# Patient Record
Sex: FEMALE | Race: WHITE | NOT HISPANIC OR LATINO | URBAN - METROPOLITAN AREA
[De-identification: names, ages, dates, MRNs, and addresses within clinical notes are randomized per-mention and may not be internally consistent; named-entity substitution may affect disease eponyms.]

---

## 2019-04-12 ASSESSMENT — MIFFLIN-ST. JEOR: SCORE: 1252.21

## 2019-04-15 ENCOUNTER — ANESTHESIA - HEALTHEAST (OUTPATIENT)
Dept: SURGERY | Facility: HOSPITAL | Age: 54
End: 2019-04-15

## 2019-04-16 ENCOUNTER — SURGERY - HEALTHEAST (OUTPATIENT)
Dept: SURGERY | Facility: HOSPITAL | Age: 54
End: 2019-04-16

## 2020-06-03 ENCOUNTER — RECORDS - HEALTHEAST (OUTPATIENT)
Dept: ADMINISTRATIVE | Facility: OTHER | Age: 55
End: 2020-06-03

## 2021-05-25 ENCOUNTER — RECORDS - HEALTHEAST (OUTPATIENT)
Dept: ADMINISTRATIVE | Facility: CLINIC | Age: 56
End: 2021-05-25

## 2021-05-26 ENCOUNTER — RECORDS - HEALTHEAST (OUTPATIENT)
Dept: ADMINISTRATIVE | Facility: CLINIC | Age: 56
End: 2021-05-26

## 2021-05-27 ENCOUNTER — RECORDS - HEALTHEAST (OUTPATIENT)
Dept: ADMINISTRATIVE | Facility: CLINIC | Age: 56
End: 2021-05-27

## 2021-05-27 NOTE — ANESTHESIA CARE TRANSFER NOTE
Last vitals:   Vitals:    04/16/19 0840   BP: 112/64   Pulse: 71   Resp: 16   Temp: 36.4  C (97.5  F)   SpO2: 98%     Patient's level of consciousness is drowsy  Spontaneous respirations: yes  Maintains airway independently: yes  Dentition unchanged: yes  Oropharynx: oropharynx clear of all foreign objects    QCDR Measures:  ASA# 20 - Surgical Safety Checklist: WHO surgical safety checklist completed prior to induction    PQRS# 430 - Adult PONV Prevention: 4558F - Pt received => 2 anti-emetic agents (different classes) preop & intraop  ASA# 8 - Peds PONV Prevention: NA - Not pediatric patient, not GA or 2 or more risk factors NOT present  PQRS# 424 - Keri-op Temp Management: 4559F - At least one body temp DOCUMENTED => 35.5C or 95.9F within required timeframe  PQRS# 426 - PACU Transfer Protocol: - Transfer of care checklist used  ASA# 14 - Acute Post-op Pain: ASA14B - Patient did NOT experience pain >= 7 out of 10

## 2021-05-27 NOTE — ANESTHESIA PREPROCEDURE EVALUATION
Anesthesia Evaluation      Patient summary reviewed     Airway   Mallampati: II   Pulmonary - negative ROS and normal exam                          Cardiovascular - negative ROS and normal exam  ECG reviewed        Neuro/Psych - negative ROS     Endo/Other       Comments: H/O motion sickness    GI/Hepatic/Renal - negative ROS           Dental - normal exam                        Anesthesia Plan  Planned anesthetic: general endotracheal    ASA 1   Induction: intravenous   Anesthetic plan and risks discussed with: patient and spouse    Post-op plan: routine recovery

## 2021-05-27 NOTE — ANESTHESIA POSTPROCEDURE EVALUATION
Patient: Colleen Xiao  LAPAROSCOPIC LEFT SALPINGO-OOPHORECTOMY  Anesthesia type: general    Patient location: Phase II Recovery  Last vitals:   Vitals Value Taken Time   /66 4/16/2019 10:31 AM   Temp 36.4  C (97.5  F) 4/16/2019  9:20 AM   Pulse 74 4/16/2019 10:45 AM   Resp 20 4/16/2019  9:49 AM   SpO2 98 % 4/16/2019 10:45 AM   Vitals shown include unvalidated device data.  Post vital signs: stable  Level of consciousness: awake and responds to simple questions  Post-anesthesia pain: pain controlled  Post-anesthesia nausea and vomiting: no  Pulmonary: unassisted, return to baseline  Cardiovascular: stable and blood pressure at baseline  Hydration: adequate  Anesthetic events: no    QCDR Measures:  ASA# 11 - Keri-op Cardiac Arrest: ASA11B - Patient did NOT experience unanticipated cardiac arrest  ASA# 12 - Keri-op Mortality Rate: ASA12B - Patient did NOT die  ASA# 13 - PACU Re-Intubation Rate: ASA13B - Patient did NOT require a new airway mgmt  ASA# 10 - Composite Anes Safety: ASA10A - No serious adverse event    Additional Notes:

## 2021-06-03 VITALS — WEIGHT: 155 LBS | BODY MASS INDEX: 27.46 KG/M2 | HEIGHT: 63 IN

## 2022-06-27 ENCOUNTER — HOSPITAL ENCOUNTER (OUTPATIENT)
Dept: CARDIOLOGY | Facility: HOSPITAL | Age: 57
Discharge: HOME | End: 2022-06-27
Attending: FAMILY MEDICINE | Admitting: FAMILY MEDICINE
Payer: COMMERCIAL

## 2022-06-27 DIAGNOSIS — R94.31 ABNORMAL ECG: ICD-10-CM

## 2022-06-27 DIAGNOSIS — I25.10 CAD (CORONARY ARTERY DISEASE): ICD-10-CM

## 2022-06-27 LAB
CV STRESS CURRENT BP HE: NORMAL
CV STRESS CURRENT HR HE: 100
CV STRESS CURRENT HR HE: 106
CV STRESS CURRENT HR HE: 107
CV STRESS CURRENT HR HE: 108
CV STRESS CURRENT HR HE: 112
CV STRESS CURRENT HR HE: 115
CV STRESS CURRENT HR HE: 123
CV STRESS CURRENT HR HE: 126
CV STRESS CURRENT HR HE: 127
CV STRESS CURRENT HR HE: 131
CV STRESS CURRENT HR HE: 137
CV STRESS CURRENT HR HE: 143
CV STRESS CURRENT HR HE: 146
CV STRESS CURRENT HR HE: 147
CV STRESS CURRENT HR HE: 148
CV STRESS CURRENT HR HE: 148
CV STRESS CURRENT HR HE: 80
CV STRESS CURRENT HR HE: 85
CV STRESS CURRENT HR HE: 86
CV STRESS CURRENT HR HE: 87
CV STRESS CURRENT HR HE: 87
CV STRESS CURRENT HR HE: 88
CV STRESS CURRENT HR HE: 91
CV STRESS CURRENT HR HE: 93
CV STRESS CURRENT HR HE: 95
CV STRESS CURRENT HR HE: 95
CV STRESS CURRENT HR HE: 97
CV STRESS CURRENT HR HE: 98
CV STRESS DEVIATION TIME HE: NORMAL
CV STRESS ECHO PERCENT HR HE: NORMAL
CV STRESS EXERCISE STAGE HE: NORMAL
CV STRESS EXERCISE STAGE REACHED HE: NORMAL
CV STRESS FINAL RESTING BP HE: NORMAL
CV STRESS FINAL RESTING HR HE: 87
CV STRESS MAX HR HE: 148
CV STRESS MAX TREADMILL GRADE HE: 14
CV STRESS MAX TREADMILL SPEED HE: 3.4
CV STRESS PEAK DIA BP HE: NORMAL
CV STRESS PEAK SYS BP HE: NORMAL
CV STRESS PHASE HE: NORMAL
CV STRESS PROTOCOL HE: NORMAL
CV STRESS REASON STOPPED HE: NORMAL
CV STRESS RESTING PT POSITION HE: NORMAL
CV STRESS RESTING PT POSITION HE: NORMAL
CV STRESS ST DEVIATION AMOUNT HE: NORMAL
CV STRESS ST DEVIATION ELEVATION HE: NORMAL
CV STRESS ST EVELATION AMOUNT HE: NORMAL
CV STRESS SYMPTOMS HE: NORMAL
CV STRESS TEST TYPE HE: NORMAL
CV STRESS TOTAL STAGE TIME MIN 1 HE: NORMAL
STRESS ECHO BASELINE DIASTOLIC HE: 80
STRESS ECHO BASELINE HR: 82
STRESS ECHO BASELINE SYSTOLIC BP: 112
STRESS ECHO LAST STRESS DIASTOLIC BP: 88
STRESS ECHO LAST STRESS HR: 148
STRESS ECHO LAST STRESS SYSTOLIC BP: 128
STRESS ECHO POST ESTIMATED WORKLOAD: 9.3
STRESS ECHO POST EXERCISE DUR MIN: 7
STRESS ECHO POST EXERCISE DUR SEC: 45
STRESS ECHO TARGET HR: 137

## 2022-06-27 PROCEDURE — 93017 CV STRESS TEST TRACING ONLY: CPT

## 2022-06-27 PROCEDURE — 93018 CV STRESS TEST I&R ONLY: CPT | Performed by: INTERNAL MEDICINE

## 2022-06-27 PROCEDURE — 93016 CV STRESS TEST SUPVJ ONLY: CPT | Performed by: INTERNAL MEDICINE

## 2022-08-28 ENCOUNTER — HEALTH MAINTENANCE LETTER (OUTPATIENT)
Age: 57
End: 2022-08-28

## 2023-01-14 ENCOUNTER — HEALTH MAINTENANCE LETTER (OUTPATIENT)
Age: 58
End: 2023-01-14

## 2023-09-24 ENCOUNTER — HEALTH MAINTENANCE LETTER (OUTPATIENT)
Age: 58
End: 2023-09-24

## 2024-06-30 ENCOUNTER — HEALTH MAINTENANCE LETTER (OUTPATIENT)
Age: 59
End: 2024-06-30

## 2024-11-17 ENCOUNTER — HEALTH MAINTENANCE LETTER (OUTPATIENT)
Age: 59
End: 2024-11-17